# Patient Record
Sex: MALE | Race: OTHER | Employment: FULL TIME | URBAN - METROPOLITAN AREA
[De-identification: names, ages, dates, MRNs, and addresses within clinical notes are randomized per-mention and may not be internally consistent; named-entity substitution may affect disease eponyms.]

---

## 2024-06-01 ENCOUNTER — APPOINTMENT (EMERGENCY)
Dept: CT IMAGING | Facility: HOSPITAL | Age: 44
DRG: 728 | End: 2024-06-01
Payer: COMMERCIAL

## 2024-06-01 ENCOUNTER — HOSPITAL ENCOUNTER (INPATIENT)
Facility: HOSPITAL | Age: 44
LOS: 1 days | Discharge: HOME | DRG: 728 | End: 2024-06-03
Attending: EMERGENCY MEDICINE | Admitting: STUDENT IN AN ORGANIZED HEALTH CARE EDUCATION/TRAINING PROGRAM
Payer: COMMERCIAL

## 2024-06-01 ENCOUNTER — APPOINTMENT (EMERGENCY)
Dept: ULTRASOUND IMAGING | Facility: HOSPITAL | Age: 44
DRG: 728 | End: 2024-06-01
Payer: COMMERCIAL

## 2024-06-01 DIAGNOSIS — N49.1 FUNICULITIS: ICD-10-CM

## 2024-06-01 DIAGNOSIS — N17.9 ACUTE KIDNEY INJURY (HCC): Primary | ICD-10-CM

## 2024-06-01 DIAGNOSIS — R10.9 ABDOMINAL PAIN: ICD-10-CM

## 2024-06-01 LAB
ALBUMIN SERPL BCP-MCNC: 4.2 G/DL (ref 3.5–5)
ALP SERPL-CCNC: 51 U/L (ref 34–104)
ALT SERPL W P-5'-P-CCNC: 19 U/L (ref 7–52)
ANION GAP SERPL CALCULATED.3IONS-SCNC: 9 MMOL/L (ref 4–13)
AST SERPL W P-5'-P-CCNC: 17 U/L (ref 13–39)
BACTERIA UR QL AUTO: ABNORMAL /HPF
BASOPHILS # BLD AUTO: 0.03 THOUSANDS/ÂΜL (ref 0–0.1)
BASOPHILS NFR BLD AUTO: 0 % (ref 0–1)
BILIRUB SERPL-MCNC: 0.39 MG/DL (ref 0.2–1)
BILIRUB UR QL STRIP: NEGATIVE
BUN SERPL-MCNC: 29 MG/DL (ref 5–25)
CALCIUM SERPL-MCNC: 9.1 MG/DL (ref 8.4–10.2)
CHLORIDE SERPL-SCNC: 104 MMOL/L (ref 96–108)
CLARITY UR: CLEAR
CO2 SERPL-SCNC: 27 MMOL/L (ref 21–32)
COLOR UR: YELLOW
CREAT SERPL-MCNC: 3.04 MG/DL (ref 0.6–1.3)
EOSINOPHIL # BLD AUTO: 0.22 THOUSAND/ÂΜL (ref 0–0.61)
EOSINOPHIL NFR BLD AUTO: 3 % (ref 0–6)
ERYTHROCYTE [DISTWIDTH] IN BLOOD BY AUTOMATED COUNT: 12.6 % (ref 11.6–15.1)
GFR SERPL CREATININE-BSD FRML MDRD: 24 ML/MIN/1.73SQ M
GLUCOSE SERPL-MCNC: 96 MG/DL (ref 65–140)
GLUCOSE UR STRIP-MCNC: NEGATIVE MG/DL
HCT VFR BLD AUTO: 48 % (ref 36.5–49.3)
HGB BLD-MCNC: 15.8 G/DL (ref 12–17)
HGB UR QL STRIP.AUTO: ABNORMAL
IMM GRANULOCYTES # BLD AUTO: 0.02 THOUSAND/UL (ref 0–0.2)
IMM GRANULOCYTES NFR BLD AUTO: 0 % (ref 0–2)
KETONES UR STRIP-MCNC: NEGATIVE MG/DL
LEUKOCYTE ESTERASE UR QL STRIP: NEGATIVE
LIPASE SERPL-CCNC: 11 U/L (ref 11–82)
LYMPHOCYTES # BLD AUTO: 2.02 THOUSANDS/ÂΜL (ref 0.6–4.47)
LYMPHOCYTES NFR BLD AUTO: 24 % (ref 14–44)
MCH RBC QN AUTO: 30.1 PG (ref 26.8–34.3)
MCHC RBC AUTO-ENTMCNC: 32.9 G/DL (ref 31.4–37.4)
MCV RBC AUTO: 91 FL (ref 82–98)
MONOCYTES # BLD AUTO: 0.73 THOUSAND/ÂΜL (ref 0.17–1.22)
MONOCYTES NFR BLD AUTO: 9 % (ref 4–12)
MUCOUS THREADS UR QL AUTO: ABNORMAL
NEUTROPHILS # BLD AUTO: 5.32 THOUSANDS/ÂΜL (ref 1.85–7.62)
NEUTS SEG NFR BLD AUTO: 64 % (ref 43–75)
NITRITE UR QL STRIP: NEGATIVE
NON-SQ EPI CELLS URNS QL MICRO: ABNORMAL /HPF
NRBC BLD AUTO-RTO: 0 /100 WBCS
PH UR STRIP.AUTO: 7.5 [PH] (ref 4.5–8)
PLATELET # BLD AUTO: 186 THOUSANDS/UL (ref 149–390)
PMV BLD AUTO: 9.3 FL (ref 8.9–12.7)
POTASSIUM SERPL-SCNC: 4.3 MMOL/L (ref 3.5–5.3)
PROT SERPL-MCNC: 7 G/DL (ref 6.4–8.4)
PROT UR STRIP-MCNC: NEGATIVE MG/DL
RBC # BLD AUTO: 5.25 MILLION/UL (ref 3.88–5.62)
RBC #/AREA URNS AUTO: ABNORMAL /HPF
SODIUM SERPL-SCNC: 140 MMOL/L (ref 135–147)
SP GR UR STRIP.AUTO: 1.02 (ref 1–1.03)
UROBILINOGEN UR QL STRIP.AUTO: 0.2 E.U./DL
WBC # BLD AUTO: 8.34 THOUSAND/UL (ref 4.31–10.16)
WBC #/AREA URNS AUTO: ABNORMAL /HPF

## 2024-06-01 PROCEDURE — 83690 ASSAY OF LIPASE: CPT | Performed by: EMERGENCY MEDICINE

## 2024-06-01 PROCEDURE — 96361 HYDRATE IV INFUSION ADD-ON: CPT

## 2024-06-01 PROCEDURE — 87491 CHLMYD TRACH DNA AMP PROBE: CPT | Performed by: EMERGENCY MEDICINE

## 2024-06-01 PROCEDURE — 76870 US EXAM SCROTUM: CPT

## 2024-06-01 PROCEDURE — 87591 N.GONORRHOEAE DNA AMP PROB: CPT | Performed by: EMERGENCY MEDICINE

## 2024-06-01 PROCEDURE — 74176 CT ABD & PELVIS W/O CONTRAST: CPT

## 2024-06-01 PROCEDURE — 36415 COLL VENOUS BLD VENIPUNCTURE: CPT | Performed by: EMERGENCY MEDICINE

## 2024-06-01 PROCEDURE — 96374 THER/PROPH/DIAG INJ IV PUSH: CPT

## 2024-06-01 PROCEDURE — 99285 EMERGENCY DEPT VISIT HI MDM: CPT | Performed by: EMERGENCY MEDICINE

## 2024-06-01 PROCEDURE — 80053 COMPREHEN METABOLIC PANEL: CPT | Performed by: EMERGENCY MEDICINE

## 2024-06-01 PROCEDURE — 81001 URINALYSIS AUTO W/SCOPE: CPT

## 2024-06-01 PROCEDURE — 99221 1ST HOSP IP/OBS SF/LOW 40: CPT | Performed by: INTERNAL MEDICINE

## 2024-06-01 PROCEDURE — 96375 TX/PRO/DX INJ NEW DRUG ADDON: CPT

## 2024-06-01 PROCEDURE — 99284 EMERGENCY DEPT VISIT MOD MDM: CPT

## 2024-06-01 PROCEDURE — 85025 COMPLETE CBC W/AUTO DIFF WBC: CPT | Performed by: EMERGENCY MEDICINE

## 2024-06-01 RX ORDER — HEPARIN SODIUM 5000 [USP'U]/ML
5000 INJECTION, SOLUTION INTRAVENOUS; SUBCUTANEOUS EVERY 8 HOURS SCHEDULED
Status: DISCONTINUED | OUTPATIENT
Start: 2024-06-01 | End: 2024-06-03 | Stop reason: HOSPADM

## 2024-06-01 RX ORDER — ONDANSETRON 2 MG/ML
4 INJECTION INTRAMUSCULAR; INTRAVENOUS EVERY 6 HOURS PRN
Status: DISCONTINUED | OUTPATIENT
Start: 2024-06-01 | End: 2024-06-03 | Stop reason: HOSPADM

## 2024-06-01 RX ORDER — LORATADINE 10 MG/1
10 TABLET ORAL DAILY
Status: DISCONTINUED | OUTPATIENT
Start: 2024-06-02 | End: 2024-06-03 | Stop reason: HOSPADM

## 2024-06-01 RX ORDER — ATORVASTATIN CALCIUM 10 MG/1
10 TABLET, FILM COATED ORAL DAILY
COMMUNITY
Start: 2023-12-19 | End: 2024-12-18

## 2024-06-01 RX ORDER — ATORVASTATIN CALCIUM 10 MG/1
10 TABLET, FILM COATED ORAL
Status: DISCONTINUED | OUTPATIENT
Start: 2024-06-02 | End: 2024-06-03 | Stop reason: HOSPADM

## 2024-06-01 RX ORDER — ONDANSETRON 2 MG/ML
4 INJECTION INTRAMUSCULAR; INTRAVENOUS ONCE
Status: COMPLETED | OUTPATIENT
Start: 2024-06-01 | End: 2024-06-01

## 2024-06-01 RX ORDER — SODIUM CHLORIDE 9 MG/ML
125 INJECTION, SOLUTION INTRAVENOUS CONTINUOUS
Status: DISCONTINUED | OUTPATIENT
Start: 2024-06-01 | End: 2024-06-03 | Stop reason: HOSPADM

## 2024-06-01 RX ORDER — ACETAMINOPHEN 325 MG/1
650 TABLET ORAL EVERY 6 HOURS PRN
Status: DISCONTINUED | OUTPATIENT
Start: 2024-06-01 | End: 2024-06-03 | Stop reason: HOSPADM

## 2024-06-01 RX ORDER — KETOROLAC TROMETHAMINE 30 MG/ML
15 INJECTION, SOLUTION INTRAMUSCULAR; INTRAVENOUS ONCE
Status: COMPLETED | OUTPATIENT
Start: 2024-06-01 | End: 2024-06-01

## 2024-06-01 RX ORDER — DOXYCYCLINE HYCLATE 100 MG/1
100 CAPSULE ORAL ONCE
Status: COMPLETED | OUTPATIENT
Start: 2024-06-01 | End: 2024-06-01

## 2024-06-01 RX ORDER — LORATADINE 10 MG/1
10 TABLET ORAL DAILY
COMMUNITY

## 2024-06-01 RX ORDER — DOXYCYCLINE HYCLATE 100 MG/1
100 CAPSULE ORAL EVERY 12 HOURS SCHEDULED
Status: DISCONTINUED | OUTPATIENT
Start: 2024-06-02 | End: 2024-06-03 | Stop reason: HOSPADM

## 2024-06-01 RX ORDER — ENOXAPARIN SODIUM 100 MG/ML
40 INJECTION SUBCUTANEOUS DAILY
Status: DISCONTINUED | OUTPATIENT
Start: 2024-06-02 | End: 2024-06-01

## 2024-06-01 RX ADMIN — DOXYCYCLINE HYCLATE 100 MG: 100 CAPSULE ORAL at 21:17

## 2024-06-01 RX ADMIN — HEPARIN SODIUM 5000 UNITS: 5000 INJECTION INTRAVENOUS; SUBCUTANEOUS at 23:22

## 2024-06-01 RX ADMIN — LIDOCAINE HYDROCHLORIDE 500 MG: 10 INJECTION, SOLUTION EPIDURAL; INFILTRATION; INTRACAUDAL; PERINEURAL at 21:16

## 2024-06-01 RX ADMIN — ONDANSETRON 4 MG: 2 INJECTION INTRAMUSCULAR; INTRAVENOUS at 18:46

## 2024-06-01 RX ADMIN — SODIUM CHLORIDE 1000 ML: 0.9 INJECTION, SOLUTION INTRAVENOUS at 18:47

## 2024-06-01 RX ADMIN — KETOROLAC TROMETHAMINE 15 MG: 30 INJECTION, SOLUTION INTRAMUSCULAR; INTRAVENOUS at 18:49

## 2024-06-01 RX ADMIN — SODIUM CHLORIDE 125 ML/HR: 0.9 INJECTION, SOLUTION INTRAVENOUS at 23:23

## 2024-06-01 RX ADMIN — SODIUM CHLORIDE 1000 ML: 0.9 INJECTION, SOLUTION INTRAVENOUS at 20:18

## 2024-06-01 NOTE — LETTER
[place] [address]  [address] [facility] [address]  Dept: [phone]    Agnieszka 3, 2024     Patient: Antony Harvey   YOB: 1980   Date of Visit: 6/1/2024       To Whom it May Concern:    Antony Harvey is under my professional care. He was seen in the hospital from 6/1/2024 to 06/03/24. He may return to work on 06/05/24 with the following limitations: 20 lbs weight restriction for 1 week.    If you have any questions or concerns, please don't hesitate to call.         Sincerely,          Lizzy Denis MD

## 2024-06-01 NOTE — ED PROVIDER NOTES
History  Chief Complaint   Patient presents with    Abdominal Pain     Pt c/o left lower abdominal pain radiating into his testicle, also c/o fever and vomiting x 3 since Tuesday. Also states that he fell at work earlier and since then the pain in his testicles has been worse.      43 y.o. M p/w LLQ pain x 4 days.  Initially burning and crampy, now sharp.  Constant.  Nonradiating.  Last vomiting/diarrhea x 3 days ago, but still feels nauseous.  Reports subjective fever, cough. Also reports having some left testicular pain that worsened today after a fall.  Denies testicular swelling, dysuria, frequency..      History provided by:  Patient   used: Yes    Abdominal Pain  Associated symptoms: cough, diarrhea (Tuesday), fever, nausea and vomiting    Associated symptoms: no dysuria        None       History reviewed. No pertinent past medical history.    History reviewed. No pertinent surgical history.    History reviewed. No pertinent family history.  I have reviewed and agree with the history as documented.    E-Cigarette/Vaping     E-Cigarette/Vaping Substances     Social History     Tobacco Use    Smoking status: Never    Smokeless tobacco: Never   Substance Use Topics    Alcohol use: Yes     Comment: occasional    Drug use: Never       Review of Systems   Constitutional:  Positive for appetite change and fever.   HENT:  Positive for sneezing. Negative for rhinorrhea.    Respiratory:  Positive for cough.    Gastrointestinal:  Positive for abdominal pain, diarrhea (Tuesday), nausea and vomiting.   Genitourinary:  Positive for testicular pain. Negative for dysuria, frequency and scrotal swelling.   Musculoskeletal:  Negative for myalgias.   Neurological:  Positive for headaches.       Physical Exam  Physical Exam  Vitals and nursing note reviewed.   Constitutional:       General: He is not in acute distress.     Appearance: Normal appearance. He is well-developed. He is not ill-appearing,  toxic-appearing or diaphoretic.   HENT:      Head: Normocephalic and atraumatic.   Eyes:      General: No scleral icterus.  Neck:      Vascular: No JVD.   Cardiovascular:      Rate and Rhythm: Normal rate and regular rhythm.      Heart sounds: Normal heart sounds. No murmur heard.  Pulmonary:      Effort: Pulmonary effort is normal. No accessory muscle usage or respiratory distress.      Breath sounds: Normal breath sounds. No stridor. No wheezing, rhonchi or rales.   Abdominal:      General: There is no distension.      Palpations: Abdomen is soft. Abdomen is not rigid. There is no mass.      Tenderness: There is abdominal tenderness in the left lower quadrant. There is no guarding or rebound. Negative signs include Kaufman's sign and McBurney's sign.   Skin:     General: Skin is warm and dry.      Coloration: Skin is not jaundiced or pale.      Findings: No rash.   Neurological:      Mental Status: He is alert.      GCS: GCS eye subscore is 4. GCS verbal subscore is 5. GCS motor subscore is 6.         Vital Signs  ED Triage Vitals   Temperature Pulse Respirations Blood Pressure SpO2   06/01/24 1817 06/01/24 1817 06/01/24 1817 06/01/24 1817 06/01/24 1817   98.7 °F (37.1 °C) 70 18 (!) 173/98 96 %      Temp Source Heart Rate Source Patient Position - Orthostatic VS BP Location FiO2 (%)   06/01/24 1817 06/01/24 1817 06/01/24 2100 06/01/24 2100 --   Oral Monitor Lying Right arm       Pain Score       06/01/24 1817       8           Vitals:    06/01/24 1817 06/01/24 2100   BP: (!) 173/98 148/90   Pulse: 70 55   Patient Position - Orthostatic VS:  Lying         Visual Acuity      ED Medications  Medications   sodium chloride 0.9 % bolus 1,000 mL (1,000 mL Intravenous New Bag 6/1/24 2018)   cefTRIAXone (ROCEPHIN) 500 mg in lidocaine (PF) (XYLOCAINE-MPF) 1 % IM only syringe (has no administration in time range)   doxycycline hyclate (VIBRAMYCIN) capsule 100 mg (has no administration in time range)   sodium chloride 0.9 %  bolus 1,000 mL (1,000 mL Intravenous New Bag 6/1/24 1847)   ketorolac (TORADOL) injection 15 mg (15 mg Intravenous Given 6/1/24 1849)   ondansetron (ZOFRAN) injection 4 mg (4 mg Intravenous Given 6/1/24 1846)       Diagnostic Studies  Results Reviewed       Procedure Component Value Units Date/Time    Urine Microscopic [[id number]]  (Abnormal) Collected: 06/01/24 2011    Lab Status: Final result Specimen: Urine, Clean Catch Updated: 06/01/24 2030     RBC, UA 4-10 /hpf      WBC, UA 1-2 /hpf      Epithelial Cells None Seen /hpf      Bacteria, UA None Seen /hpf      MUCUS THREADS Occasional    Chlamydia/GC amplified DNA by PCR [[id number]] Collected: 06/01/24 2009    Lab Status: In process Specimen: Urine, Other Updated: 06/01/24 2017    Urine Macroscopic, POC [[id number]]  (Abnormal) Collected: 06/01/24 2011    Lab Status: Final result Specimen: Urine Updated: 06/01/24 2012     Color, UA Yellow     Clarity, UA Clear     pH, UA 7.5     Leukocytes, UA Negative     Nitrite, UA Negative     Protein, UA Negative mg/dl      Glucose, UA Negative mg/dl      Ketones, UA Negative mg/dl      Urobilinogen, UA 0.2 E.U./dl      Bilirubin, UA Negative     Occult Blood, UA Trace     Specific Gravity, UA 1.020    Narrative:      CLINITEK RESULT    Comprehensive metabolic panel [[id number]]  (Abnormal) Collected: 06/01/24 1842    Lab Status: Final result Specimen: Blood from Line, Venous Updated: 06/01/24 1902     Sodium 140 mmol/L      Potassium 4.3 mmol/L      Chloride 104 mmol/L      CO2 27 mmol/L      ANION GAP 9 mmol/L      BUN 29 mg/dL      Creatinine 3.04 mg/dL      Glucose 96 mg/dL      Calcium 9.1 mg/dL      AST 17 U/L      ALT 19 U/L      Alkaline Phosphatase 51 U/L      Total Protein 7.0 g/dL      Albumin 4.2 g/dL      Total Bilirubin 0.39 mg/dL      eGFR 24 ml/min/1.73sq m     Narrative:      National Kidney Disease Foundation guidelines for Chronic Kidney Disease (CKD):     Stage 1 with normal or high GFR (GFR > 90 mL/min/1.73  square meters)    Stage 2 Mild CKD (GFR = 60-89 mL/min/1.73 square meters)    Stage 3A Moderate CKD (GFR = 45-59 mL/min/1.73 square meters)    Stage 3B Moderate CKD (GFR = 30-44 mL/min/1.73 square meters)    Stage 4 Severe CKD (GFR = 15-29 mL/min/1.73 square meters)    Stage 5 End Stage CKD (GFR <15 mL/min/1.73 square meters)  Note: GFR calculation is accurate only with a steady state creatinine    Lipase [[id number]]  (Normal) Collected: 06/01/24 1842    Lab Status: Final result Specimen: Blood from Line, Venous Updated: 06/01/24 1902     Lipase 11 u/L     CBC and differential [[id number]] Collected: 06/01/24 1842    Lab Status: Final result Specimen: Blood from Line, Venous Updated: 06/01/24 1848     WBC 8.34 Thousand/uL      RBC 5.25 Million/uL      Hemoglobin 15.8 g/dL      Hematocrit 48.0 %      MCV 91 fL      MCH 30.1 pg      MCHC 32.9 g/dL      RDW 12.6 %      MPV 9.3 fL      Platelets 186 Thousands/uL      nRBC 0 /100 WBCs      Segmented % 64 %      Immature Grans % 0 %      Lymphocytes % 24 %      Monocytes % 9 %      Eosinophils Relative 3 %      Basophils Relative 0 %      Absolute Neutrophils 5.32 Thousands/µL      Absolute Immature Grans 0.02 Thousand/uL      Absolute Lymphocytes 2.02 Thousands/µL      Absolute Monocytes 0.73 Thousand/µL      Eosinophils Absolute 0.22 Thousand/µL      Basophils Absolute 0.03 Thousands/µL                    CT abdomen pelvis wo contrast   Final Result by Kelvin Jara MD (06/01 2018)      No acute findings in the abdomen or pelvis within the limits of unenhanced technique.      Workstation performed: [id number]         US scrotum and testicles   Final Result by Kelvin Jara MD (06/01 2013)      No evidence for testicular torsion.      Question asymmetric hyperemia in the region of the left spermatic cord possibly reflective of infectious versus inflammatory funiculitis. Recommend clinical correlation.      Workstation performed: [id number]                     Procedures  Procedures         ED Course  ED Course as of 06/01/24 2110   Sat Jun 01, 2024   1848 CBC and differential  Normal   1903 Creatinine(!): 3.04  Elevated; 0.99 on 11/30/23 2003 Updated pt and family on labs and plan for admission due to XOCHITL. Pt reports pain has improved. A/w CT and US reads.   2038 Updated pt and family on CT/US result and plan for admission. Foley Text sent to [facility].                               SBIRT 22yo+      Flowsheet Row Most Recent Value   Initial Alcohol Screen: US AUDIT-C     1. How often do you have a drink containing alcohol? 0 Filed at: 06/01/2024 1958   2. How many drinks containing alcohol do you have on a typical day you are drinking?  0 Filed at: 06/01/2024 1958   3a. Male UNDER 65: How often do you have five or more drinks on one occasion? 0 Filed at: 06/01/2024 1958   Audit-C Score 0 Filed at: 06/01/2024 1958   JUSTYNA: How many times in the past year have you...    Used an illegal drug or used a prescription medication for non-medical reasons? Never Filed at: 06/01/2024 1958                      Medical Decision Making  1. LLQ pain - Will check CBC as marker of infection, CMP to r/o hepatitis/biliary disease, lipase to r/o pancreatitis, urine to r/o UTI, CT A/P to r/o ureteral stone/colitis/diverticulitis.  2. Testicular pain - Will US to r/o torsion, send urine to r/o UTI/GC/chlamydia.    Amount and/or Complexity of Data Reviewed  Labs: ordered. Decision-making details documented in ED Course.  Radiology: ordered.    Risk  Prescription drug management.  Decision regarding hospitalization.             Disposition  Final diagnoses:   Acute kidney injury (HCC)   Abdominal pain   Funiculitis     Time reflects when diagnosis was documented in both MDM as applicable and the Disposition within this note       Time User Action Codes Description Comment    6/1/2024  8:10 PM Loly Molina [N17.9] Acute kidney injury (HCC)     6/1/2024  8:11 PM Loly Molina Add  [R10.9] Abdominal pain     6/1/2024  8:47 PM Loly Molina Add [N49.1] Funiculitis           ED Disposition       ED Disposition   Admit    Condition   Stable    Date/Time   Sat Jun 1, 2024 2054    Comment   Case was discussed with Dr. Andrew and the patient's admission status was agreed to be Admission Status: observation status to the service of Dr. Andrew .               Follow-up Information    None         Patient's Medications    No medications on file       No discharge procedures on file.    PDMP Review       None            ED Provider  Electronically Signed by             Loly Molina DO  06/01/24 6345

## 2024-06-02 PROBLEM — N17.9 AKI (ACUTE KIDNEY INJURY) (HCC): Status: ACTIVE | Noted: 2024-06-02

## 2024-06-02 LAB
ANION GAP SERPL CALCULATED.3IONS-SCNC: 3 MMOL/L (ref 4–13)
BUN SERPL-MCNC: 21 MG/DL (ref 5–25)
CALCIUM SERPL-MCNC: 8.8 MG/DL (ref 8.4–10.2)
CHLORIDE SERPL-SCNC: 109 MMOL/L (ref 96–108)
CO2 SERPL-SCNC: 28 MMOL/L (ref 21–32)
CREAT SERPL-MCNC: 2.15 MG/DL (ref 0.6–1.3)
GFR SERPL CREATININE-BSD FRML MDRD: 36 ML/MIN/1.73SQ M
GLUCOSE SERPL-MCNC: 110 MG/DL (ref 65–140)
POTASSIUM SERPL-SCNC: 4.7 MMOL/L (ref 3.5–5.3)
SODIUM SERPL-SCNC: 140 MMOL/L (ref 135–147)

## 2024-06-02 PROCEDURE — 80048 BASIC METABOLIC PNL TOTAL CA: CPT | Performed by: STUDENT IN AN ORGANIZED HEALTH CARE EDUCATION/TRAINING PROGRAM

## 2024-06-02 PROCEDURE — 99232 SBSQ HOSP IP/OBS MODERATE 35: CPT | Performed by: STUDENT IN AN ORGANIZED HEALTH CARE EDUCATION/TRAINING PROGRAM

## 2024-06-02 RX ADMIN — SODIUM CHLORIDE 125 ML/HR: 0.9 INJECTION, SOLUTION INTRAVENOUS at 13:23

## 2024-06-02 RX ADMIN — ATORVASTATIN CALCIUM 10 MG: 10 TABLET, FILM COATED ORAL at 16:04

## 2024-06-02 RX ADMIN — HEPARIN SODIUM 5000 UNITS: 5000 INJECTION INTRAVENOUS; SUBCUTANEOUS at 20:32

## 2024-06-02 RX ADMIN — DOXYCYCLINE 100 MG: 100 CAPSULE ORAL at 08:28

## 2024-06-02 RX ADMIN — HEPARIN SODIUM 5000 UNITS: 5000 INJECTION INTRAVENOUS; SUBCUTANEOUS at 05:21

## 2024-06-02 RX ADMIN — HEPARIN SODIUM 5000 UNITS: 5000 INJECTION INTRAVENOUS; SUBCUTANEOUS at 13:23

## 2024-06-02 RX ADMIN — SODIUM CHLORIDE 125 ML/HR: 0.9 INJECTION, SOLUTION INTRAVENOUS at 05:21

## 2024-06-02 RX ADMIN — LORATADINE 10 MG: 10 TABLET ORAL at 08:28

## 2024-06-02 RX ADMIN — DOXYCYCLINE 100 MG: 100 CAPSULE ORAL at 20:32

## 2024-06-02 RX ADMIN — SODIUM CHLORIDE 125 ML/HR: 0.9 INJECTION, SOLUTION INTRAVENOUS at 21:28

## 2024-06-02 NOTE — UTILIZATION REVIEW
Initial Clinical Review  Observation on 06/01 @ 2108 upgraded to Inpatient on 06/02. Pt requiring continued stay d/t continued management of XOCHITL and funiculitis.    Admission: Date/Time/Statement:   Admission Orders (From admission, onward)       Ordered        06/02/24 1730  INPATIENT ADMISSION  Once            06/01/24 2108  Place in Observation  Once                          Orders Placed This Encounter   Procedures    INPATIENT ADMISSION     Standing Status:   Standing     Number of Occurrences:   1     Order Specific Question:   Level of Care     Answer:   Med Surg [16]     Order Specific Question:   Estimated length of stay     Answer:   More than 2 Midnights     Order Specific Question:   Certification     Answer:   I certify that inpatient services are medically necessary for this patient for a duration of greater than two midnights. See H&P and MD Progress Notes for additional information about the patient's course of treatment.     Comments:   antibiotics, xochitl, iv hydration     ED Arrival Information       Expected   -    Arrival   6/1/2024 18:08    Acuity   Urgent              Means of arrival   Walk-In    Escorted by   Family Member    Service   Hospitalist    Admission type   Emergency              Arrival complaint   abdominal pain             Chief Complaint   Patient presents with    Abdominal Pain     Pt c/o left lower abdominal pain radiating into his testicle, also c/o fever and vomiting x 3 since Tuesday. Also states that he fell at work earlier and since then the pain in his testicles has been worse.        Initial Presentation: 43 y.o. male presented to the ED from work w/ c/o L lower abdominal pain that started on Tuesday. Described as sharp, non radiating at first. The next day, associated w/ nausea w/ multiple episodes of vomiting and chills. Today while he was at work, he felt excruciating pain around his left scrotum rubbing on his inner thigh after a misstep.   In the ED, /98.  "Creatinine 3.04.   Ct abd/pelvis is negative for acute pathology.   Scrotal/testicular US shows \"Question asymmetric hyperemia in the region of the left spermatic cord. No evidence for extratesticular mass or hernia demonstrated\"   On exam, skin rash, LLQ abdominal tenderness present.  Given 2L total IVF bolus, IV Toradol, IV Zofran, IM Rocephin, po Vibramycin.    Admit as observation level of care for XOCHITL, funiculitis.  Plan: Continue with IVF @125ml/hr. Repeat renal function in AM. Continue with doxy 100mg BID for now. Follow up GC/chlamydia screen. Zofran prn N/V. Urology consult in AM     Anticipated Length of Stay/Certification Statement: The patient will continue to require additional inpatient hospital stay due to xochitl, iv hydration     06/02 Obs to IP  Urology Consult: Left-sided testicular pain   Plan: Continue medical optimization per primary team. No obstructive/postrenal causes for XOCHITL.  Therefore no need for surgical intervention. ecommend conservative management with supportive undergarments, sitz bath's, anti-inflammatories. May continue for total 10-day course of Doxycycline. For empiric management. Pending GC.     IM Notes: Pt reports improvement in his symptoms. Creatinine improving- 2.15 today. Cont IV hydration. Urinary retention protocol. Mon creatinine. Awaiting chlamydia / GC     Date: 06/03  Day 2: Pt scrotum still swelling. GC/chlamydia neg. Dc IVF. Cont Vibramycin. Cont Claritin. Cont Sitz bath.    ED Triage Vitals   Temperature Pulse Respirations Blood Pressure SpO2   06/01/24 1817 06/01/24 1817 06/01/24 1817 06/01/24 1817 06/01/24 1817   98.7 °F (37.1 °C) 70 18 (!) 173/98 96 %      Temp Source Heart Rate Source Patient Position - Orthostatic VS BP Location FiO2 (%)   06/01/24 1817 06/01/24 1817 06/01/24 2100 06/01/24 2100 --   Oral Monitor Lying Right arm       Pain Score       06/01/24 1817       8          Wt Readings from Last 1 Encounters:   06/01/24 90 kg (198 lb 6.6 oz) "     Additional Vital Signs:   Date/Time Temp Pulse Resp BP MAP (mmHg) SpO2 O2 Device Patient Position - Orthostatic VS   06/03/24 07:17:39 97.9 °F (36.6 °C) 60 16 123/83 96 96 % None (Room air) Lying   06/02/24 2310 98.1 °F (36.7 °C) 51 Abnormal  16 143/91 104 97 % None (Room air) Lying   06/02/24 15:17:53 98.1 °F (36.7 °C) 57 16 122/81 95 97 % None (Room air) Lying     Date/Time Temp Pulse Resp BP MAP (mmHg) SpO2 O2 Device Patient Position - Orthostatic VS   06/02/24 07:25:58 98.1 °F (36.7 °C) 65 16 121/85 97 97 % None (Room air) Lying   06/02/24 0029 -- 56 16 142/87 100 97 % None (Room air) Sitting   06/01/24 2100 -- 55 16 148/90 114 97 % None (Room air) Lying       Pertinent Labs/Diagnostic Test Results:   CT abdomen pelvis wo contrast   Final Result by Kelvin Jara MD (06/01 2018)      No acute findings in the abdomen or pelvis within the limits of unenhanced technique.      Workstation performed: [id number]         US scrotum and testicles   Final Result by Kelvin Jara MD (06/01 2013)      No evidence for testicular torsion.      Question asymmetric hyperemia in the region of the left spermatic cord possibly reflective of infectious versus inflammatory funiculitis. Recommend clinical correlation.      Workstation performed: [id number]               Results from last 7 days   Lab Units 06/01/24  1842   WBC Thousand/uL 8.34   HEMOGLOBIN g/dL 15.8   HEMATOCRIT % 48.0   PLATELETS Thousands/uL 186   TOTAL NEUT ABS Thousands/µL 5.32         Results from last 7 days   Lab Units 06/03/24  0518 06/02/24  1017 06/01/24  1842   SODIUM mmol/L 140 140 140   POTASSIUM mmol/L 4.0 4.7 4.3   CHLORIDE mmol/L 111* 109* 104   CO2 mmol/L 24 28 27   ANION GAP mmol/L 5 3* 9   BUN mg/dL 18 21 29*   CREATININE mg/dL 1.51* 2.15* 3.04*   EGFR ml/min/1.73sq m 56 36 24   CALCIUM mg/dL 8.1* 8.8 9.1     Results from last 7 days   Lab Units 06/01/24  1842   AST U/L 17   ALT U/L 19   ALK PHOS U/L 51   TOTAL PROTEIN g/dL 7.0   ALBUMIN g/dL 4.2    TOTAL BILIRUBIN mg/dL 0.39         Results from last 7 days   Lab Units 06/03/24  0518 06/02/24  1017 06/01/24  1842   GLUCOSE RANDOM mg/dL 100 110 96                             Results from last 7 days   Lab Units 06/01/24  1842   LIPASE u/L 11                 Results from last 7 days   Lab Units 06/01/24 2011   CLARITY UA  Clear   COLOR UA  Yellow   SPEC GRAV UA  1.020   PH UA  7.5   GLUCOSE UA mg/dl Negative   KETONES UA mg/dl Negative   BLOOD UA  Trace*   PROTEIN UA mg/dl Negative   NITRITE UA  Negative   BILIRUBIN UA  Negative   UROBILINOGEN UA E.U./dl 0.2   LEUKOCYTES UA  Negative   WBC UA /hpf 1-2   RBC UA /hpf 4-10*   BACTERIA UA /hpf None Seen   EPITHELIAL CELLS WET PREP /hpf None Seen   MUCUS THREADS  Occasional*           ED Treatment:   Medication Administration from 06/01/2024 1808 to 06/01/2024 2203         Date/Time Order Dose Route Action Action by Comments     06/01/2024 2118 EDT sodium chloride 0.9 % bolus 1,000 mL 0 mL Intravenous Stopped Sepideh Braga RN --     06/01/2024 1847 EDT sodium chloride 0.9 % bolus 1,000 mL 1,000 mL Intravenous New Bag Sepideh Braga RN --     06/01/2024 1849 EDT ketorolac (TORADOL) injection 15 mg 15 mg Intravenous Given Sepideh Braga RN --     06/01/2024 1846 EDT ondansetron (ZOFRAN) injection 4 mg 4 mg Intravenous Given Sepideh Braga RN --     06/01/2024 2118 EDT sodium chloride 0.9 % bolus 1,000 mL 0 mL Intravenous Stopped Sepideh Braga RN --     06/01/2024 2018 EDT sodium chloride 0.9 % bolus 1,000 mL 1,000 mL Intravenous New Bag Sepideh Braga RN --     06/01/2024 2116 EDT cefTRIAXone (ROCEPHIN) 500 mg in lidocaine (PF) (XYLOCAINE-MPF) 1 % IM only syringe 500 mg Intramuscular Given Sepideh Braga RN --     06/01/2024 2117 EDT doxycycline hyclate (VIBRAMYCIN) capsule 100 mg 100 mg Oral Given Sepideh Braga RN --          History reviewed. No pertinent past medical history.  Present on Admission:   Funiculitis   XOCHITL (acute kidney injury) (HCC)      Admitting  Diagnosis: Funiculitis [N49.1]  Abdominal pain [R10.9]  Acute kidney injury (HCC) [N17.9]  Age/Sex: 43 y.o. male  Admission Orders:  SCD    Scheduled Medications:  atorvastatin (LIPITOR) tablet 10 mg po daily   doxycycline hyclate (VIBRAMYCIN) capsule 100 mg po q12h  heparin (porcine) subcutaneous injection 5,000 Units q8h SC  loratadine (CLARITIN) tablet 10 mg po daily    Continuous IV Infusions:  sodium chloride, 125 mL/hr, Intravenous, Continuous      PRN Meds:        IP CONSULT TO UROLOGY    Network Utilization Review Department  ATTENTION: Please call with any questions or concerns to [phone] and carefully listen to the prompts so that you are directed to the right person. All voicemails are confidential.   For Discharge needs, contact Care Management DC Support Team at [phone] opt. 2  Send all requests for admission clinical reviews, approved or denied determinations and any other requests to dedicated fax number below belonging to the [place] where the patient is receiving treatment. List of dedicated fax numbers for the Facilities:  FACILITY NAME UR FAX NUMBER   ADMISSION DENIALS (Administrative/Medical Necessity) [phone]   DISCHARGE SUPPORT TEAM (NETWORK) [phone]   PARENT CHILD HEALTH (Maternity/NICU/Pediatrics) [phone]   [facility] [phone]   [facility] [phone]   [facility] [phone]   [facility] [phone]   [facility] [phone]   [facility] [phone]   [facility] [phone]   [facility] [phone]   [facility] [phone]   [facility] [phone]   [facility] [phone]   [facility] [phone]

## 2024-06-02 NOTE — ASSESSMENT & PLAN NOTE
Suspect 2/2 poor oral intake, N/V  CT abd/pelvis shows no renal/bladder pathology   UA not suggestive of infection  Continue with IVF @125ml/hr  Repeat renal function in AM

## 2024-06-02 NOTE — PROGRESS NOTES
[facility]  Progress Note  Name: Antony Harvey I  MRN: [id number]  Unit/Bed#: E5 -01 I Date of Admission: 6/1/2024   Date of Service: 6/2/2024 I Hospital Day: 0    Assessment & Plan   * XOCHITL (acute kidney injury) (HCC)  Assessment & Plan  43 year old Chinese speaking male was admitted due to acute kidney injury. Etiology: Ibuprofen? (Although one time use only last Thursday). Versus decreased po intake / Pre-renal.   Continue IV hydration  Urinary retention protocol    Recent Labs     06/01/24  1842 06/02/24  1017   BUN 29* 21   CREATININE 3.04* 2.15*   EGFR 24 36       Results from last 7 days   Lab Units 06/01/24 2011   BLOOD UA  Trace*   PROTEIN UA mg/dl Negative         Funiculitis  Assessment & Plan  Had nausea, vomiting, and abdominal pain. Improving.   CT Abdomen pelvis showing: No acute findings in the abdomen or pelvis within the limits of unenhanced technique.   US scrotum and testicles: No evidence for testicular torsion. Question asymmetric hyperemia in the region of the left spermatic cord possibly reflective of infectious versus inflammatory funiculitis. Recommend clinical correlation.  Received Ceftriaxone IM  Urology recommendations appreciated:   Conservative management with supportive undergarments, sitz bath's, anti-inflammatories.  Continue for total 10-day course of doxycycline.  Awaiting chlamydia / GC           VTE Pharmacologic Prophylaxis:   Pharmacologic: Heparin  Mechanical VTE Prophylaxis in Place: Yes    Discussions with Specialists or Other Care Team Provider: urology    Education and Discussions with Family / Patient: patient, spouse    Current Length of Stay: 0 day(s)    Current Patient Status: Observation   Certification Statement: The patient will continue to require additional inpatient hospital stay due to xochitl, iv hydration    Discharge Plan: neema    Code Status: Level 1 - Full Code      Subjective:   Patient seen and examined at  bedside. Reports improvement with his symptoms. [employer]  services were utilized. [id number]    Objective:     Vitals:   Temp (24hrs), Av.4 °F (36.9 °C), Min:98.1 °F (36.7 °C), Max:98.7 °F (37.1 °C)    Temp:  [98.1 °F (36.7 °C)-98.7 °F (37.1 °C)] 98.1 °F (36.7 °C)  HR:  [55-70] 65  Resp:  [16-18] 16  BP: (121-173)/(85-98) 121/85  SpO2:  [96 %-97 %] 97 %  There is no height or weight on file to calculate BMI.     Input and Output Summary (last 24 hours):       Intake/Output Summary (Last 24 hours) at 2024 1113  Last data filed at 2024 0854  Gross per 24 hour   Intake 120 ml   Output --   Net 120 ml       Physical Exam:     Physical Exam  Vitals reviewed.   HENT:      Head: Normocephalic.      Nose: Nose normal.      Mouth/Throat:      Mouth: Mucous membranes are moist.   Eyes:      General: No scleral icterus.  Cardiovascular:      Rate and Rhythm: Normal rate and regular rhythm.   Pulmonary:      Effort: Pulmonary effort is normal. No respiratory distress.      Breath sounds: No stridor. No wheezing or rales.   Abdominal:      General: There is no distension.      Palpations: Abdomen is soft.      Tenderness: There is no abdominal tenderness.   Skin:     General: Skin is warm.   Neurological:      Mental Status: He is alert and oriented to person, place, and time.   Psychiatric:         Mood and Affect: Mood normal.         Behavior: Behavior normal.       Additional Data:     Labs:    Results from last 7 days   Lab Units 24  1842   WBC Thousand/uL 8.34   HEMOGLOBIN g/dL 15.8   HEMATOCRIT % 48.0   PLATELETS Thousands/uL 186   SEGS PCT % 64   LYMPHO PCT % 24   MONO PCT % 9   EOS PCT % 3     Results from last 7 days   Lab Units 24  1017 24  1842   SODIUM mmol/L 140 140   POTASSIUM mmol/L 4.7 4.3   CHLORIDE mmol/L 109* 104   CO2 mmol/L 28 27   BUN mg/dL 21 29*   CREATININE mg/dL 2.15* 3.04*   ANION GAP mmol/L 3* 9   CALCIUM mg/dL 8.8 9.1   ALBUMIN g/dL  --  4.2   TOTAL BILIRUBIN mg/dL   --  0.39   ALK PHOS U/L  --  51   ALT U/L  --  19   AST U/L  --  17   GLUCOSE RANDOM mg/dL 110 96                           * I Have Reviewed All Lab Data Listed Above.  * Additional Pertinent Lab Tests Reviewed: All Labs For Current Hospital Admission Reviewed    Mobility:  Basic Mobility Inpatient Raw Score: 24  -HL Goal: 8: Walk 250 feet or more  JH-HLM Achieved: 8: Walk 250 feet ot more    Lines:   Invasive Devices       Peripheral Intravenous Line  Duration             Peripheral IV 06/01/24 Right Antecubital <1 day                       Imaging:    Imaging Reports Reviewed Today Include: US scrotum, ct a/p    Recent Cultures (last 7 days):           Last 24 Hours Medication List:   Current Facility-Administered Medications   Medication Dose Route Frequency Provider Last Rate    acetaminophen  650 mg Oral Q6H PRN Radha Andrew MD      atorvastatin  10 mg Oral Daily With Dinner Radha Andrew MD      doxycycline hyclate  100 mg Oral Q12H DEAN Andrew MD      heparin (porcine)  5,000 Units Subcutaneous Q8H DEAN Andrew MD      loratadine  10 mg Oral Daily Radha Andrew MD      ondansetron  4 mg Intravenous Q6H PRN Radha Andrew MD      sodium chloride  125 mL/hr Intravenous Continuous Radha Andrew  mL/hr (06/02/24 0521)        Today, Patient Was Seen By: Fam Hudson MD    ** Please Note: Dictation voice to text software may have been used in the creation of this document. **

## 2024-06-02 NOTE — ASSESSMENT & PLAN NOTE
Had nausea, vomiting, and abdominal pain. Improving.   CT Abdomen pelvis showing: No acute findings in the abdomen or pelvis within the limits of unenhanced technique.   US scrotum and testicles: No evidence for testicular torsion. Question asymmetric hyperemia in the region of the left spermatic cord possibly reflective of infectious versus inflammatory funiculitis. Recommend clinical correlation.  Received Ceftriaxone IM  Urology recommendations appreciated:   Conservative management with supportive undergarments, sitz bath's, anti-inflammatories.  Continue for total 10-day course of doxycycline.  Awaiting chlamydia / GC

## 2024-06-02 NOTE — ASSESSMENT & PLAN NOTE
43 year old Kyrgyz speaking male was admitted due to acute kidney injury. Etiology: Ibuprofen? (Although one time use only last Thursday). Pre-renal.   Continue IV hydration  Urinary retention protocol    Recent Labs     06/01/24  1842 06/02/24  1017   BUN 29* 21   CREATININE 3.04* 2.15*   EGFR 24 36       Results from last 7 days   Lab Units 06/01/24 2011   BLOOD UA  Trace*   PROTEIN UA mg/dl Negative

## 2024-06-02 NOTE — PLAN OF CARE
Problem: PAIN - ADULT  Goal: Verbalizes/displays adequate comfort level or baseline comfort level  Description: Interventions:  - Encourage patient to monitor pain and request assistance  - Assess pain using appropriate pain scale  - Administer analgesics based on type and severity of pain and evaluate response  - Implement non-pharmacological measures as appropriate and evaluate response  - Consider cultural and social influences on pain and pain management  - Notify physician/advanced practitioner if interventions unsuccessful or patient reports new pain  Outcome: Progressing     Problem: INFECTION - ADULT  Goal: Absence or prevention of progression during hospitalization  Description: INTERVENTIONS:  - Assess and monitor for signs and symptoms of infection  - Monitor lab/diagnostic results  - Monitor all insertion sites, i.e. indwelling lines, tubes, and drains  - Monitor endotracheal if appropriate and nasal secretions for changes in amount and color  - [place] appropriate cooling/warming therapies per order  - Administer medications as ordered  - Instruct and encourage patient and family to use good hand hygiene technique  - Identify and instruct in appropriate isolation precautions for identified infection/condition  Outcome: Progressing     Problem: DISCHARGE PLANNING  Goal: Discharge to home or other facility with appropriate resources  Description: INTERVENTIONS:  - Identify barriers to discharge w/patient and caregiver  - Arrange for needed discharge resources and transportation as appropriate  - Identify discharge learning needs (meds, wound care, etc.)  - Arrange for interpretive services to assist at discharge as needed  - Refer to Case Management Department for coordinating discharge planning if the patient needs post-hospital services based on physician/advanced practitioner order or complex needs related to functional status, cognitive ability, or social support system  Outcome: Progressing      Problem: Knowledge Deficit  Goal: Patient/family/caregiver demonstrates understanding of disease process, treatment plan, medications, and discharge instructions  Description: Complete learning assessment and assess knowledge base.  Interventions:  - Provide teaching at level of understanding  - Provide teaching via preferred learning methods  Outcome: Progressing

## 2024-06-02 NOTE — H&P
[facility]  H&P  Name: Antony Harvey 43 y.o. male I MRN: [id number]  Unit/Bed#: E5 -01 I Date of Admission: 6/1/2024   Date of Service: 6/2/2024 I Hospital Day: 0      Assessment & Plan   * XOCHITL (acute kidney injury) (HCC)  Assessment & Plan  Suspect 2/2 poor oral intake, N/V  CT abd/pelvis shows no renal/bladder pathology   UA not suggestive of infection  Continue with IVF @125ml/hr  Repeat renal function in AM      Funiculitis  Assessment & Plan  Patient presented with abdominal pain, N/V  CT abd/pelvis negative  Scrotal U/S suspicious for funiculitis  S/p CTX 500mg IM and received doxycycline  On examination of scrotal area, no sign of cellulitis  Continue with doxy 100mg BID for now, may be eble to dc if infectious w/u negative.   Follow up GC/chlamydia screen  Zofran prn N/V  Urology consult in AM            VTE Prophylaxis: Heparin  / sequential compression device   Code Status: Level 1  POLST: There is no POLST form on file for this patient (pre-hospital)  Discussion with family: Yes, sister, girlfriend and niece    Anticipated Length of Stay:  Patient will be admitted on an Observation basis with an anticipated length of stay of  1 - 2 midnights.   Justification for Hospital Stay: as above    Total Time for Visit, including Counseling / Coordination of Care: 45 minutes.  Greater than 50% of this total time spent on direct patient counseling and coordination of care.    Chief Complaint:   abdominal pain    History of Present Illness:    Antony Harvey is a 43 y.o. male who presents with complaints of abdominal pain. Patient was in his usual state of health up until Tuesday when he began to experience left lower abdominal pain. Initially he was able to function and go about his day. It was sharp, non radiating at first. By the next day, it became associated with nausea, multiple episodes of non-bloody vomiting. He also felt chills but did not take his  "temperature. Today while he was at work, he felt excruciating pain around his left scrotum rubbing on his inner thigh after a misstep. This is what prompted him to come to the ED.   In ED, he is afebrile, hypertensive due to pain. A Ct abd/pelvis is negative for acute pathology. A scrotal/testicular ultrasound shows \"Question asymmetric hyperemia in the region of the left spermatic cord. No evidence for extratesticular mass or hernia demonstrated\"  He was also noted to have XOCHITL. He was give antibiotics, pain medication, fluids and will be admitted for further management.     Review of Systems:    Review of Systems   Constitutional:  Positive for activity change, appetite change, chills and fever.   HENT:  Negative for rhinorrhea.    Eyes:  Negative for visual disturbance.   Respiratory:  Negative for cough and shortness of breath.    Cardiovascular:  Negative for chest pain and leg swelling.   Gastrointestinal:  Positive for abdominal pain, nausea and vomiting. Negative for blood in stool, constipation and diarrhea.   Endocrine: Negative for polyuria.   Genitourinary:  Negative for decreased urine volume, difficulty urinating, dysuria, hematuria, penile discharge, penile pain and scrotal swelling.   Musculoskeletal:  Negative for back pain.   Skin:  Negative for rash.   Neurological:  Negative for dizziness, syncope and light-headedness.   Hematological:  Negative for adenopathy.       Past Medical and Surgical History:     History reviewed. No pertinent past medical history.    History reviewed. No pertinent surgical history.    Meds/Allergies:    Prior to Admission medications    Medication Sig Start Date End Date Taking? Authorizing Provider   atorvastatin (LIPITOR) 10 mg tablet Take 10 mg by mouth daily 12/19/23 12/18/24 Yes Historical Provider, MD   loratadine (CLARITIN) 10 mg tablet Take 10 mg by mouth daily    Historical Provider, MD     I have reviewed home medications with patient personally.    Allergies: " No Known Allergies    Social History:     Marital Status: Single   Substance Use History:   Social History     Substance and Sexual Activity   Alcohol Use Yes    Comment: occasional     Social History     Tobacco Use   Smoking Status Never   Smokeless Tobacco Never     Social History     Substance and Sexual Activity   Drug Use Never       Family History:    non-contributory    Physical Exam:     Vitals:   Blood Pressure: 142/87 (06/02/24 0029)  Pulse: 56 (06/02/24 0029)  Temperature: 98.7 °F (37.1 °C) (06/01/24 1817)  Temp Source: Oral (06/02/24 0029)  Respirations: 16 (06/02/24 0029)  Weight - Scale: 90 kg (198 lb 6.6 oz) (06/01/24 1817)  SpO2: 97 % (06/02/24 0029)    Physical Exam  Constitutional:       General: He is not in acute distress.     Appearance: Normal appearance. He is not ill-appearing or toxic-appearing.   HENT:      Head: Normocephalic.      Nose: No congestion or rhinorrhea.   Eyes:      General: No scleral icterus.  Cardiovascular:      Rate and Rhythm: Normal rate and regular rhythm.      Heart sounds: No murmur heard.  Pulmonary:      Effort: Pulmonary effort is normal. No respiratory distress.      Breath sounds: Normal breath sounds. No wheezing.   Abdominal:      General: Bowel sounds are normal. There is no distension.      Palpations: Abdomen is soft. There is no mass.      Tenderness: There is abdominal tenderness in the left lower quadrant. There is no guarding or rebound.   Genitourinary:     Testes:         Left: Tenderness or swelling not present.      Comments: Refer to media   Musculoskeletal:      Right lower leg: No edema.      Left lower leg: No edema.   Skin:     General: Skin is warm and dry.      Findings: Rash present.   Neurological:      Mental Status: He is alert.           Additional Data:     Lab Results: I have personally reviewed pertinent reports.      Results from last 7 days   Lab Units 06/01/24  1842   WBC Thousand/uL 8.34   HEMOGLOBIN g/dL 15.8   HEMATOCRIT % 48.0    PLATELETS Thousands/uL 186   SEGS PCT % 64   LYMPHO PCT % 24   MONO PCT % 9   EOS PCT % 3     Results from last 7 days   Lab Units 06/01/24  1842   SODIUM mmol/L 140   POTASSIUM mmol/L 4.3   CHLORIDE mmol/L 104   CO2 mmol/L 27   BUN mg/dL 29*   CREATININE mg/dL 3.04*   ANION GAP mmol/L 9   CALCIUM mg/dL 9.1   ALBUMIN g/dL 4.2   TOTAL BILIRUBIN mg/dL 0.39   ALK PHOS U/L 51   ALT U/L 19   AST U/L 17   GLUCOSE RANDOM mg/dL 96                       Imaging: I have personally reviewed pertinent reports.      CT abdomen pelvis wo contrast   Final Result by Kelvin Jara MD (06/01 2018)      No acute findings in the abdomen or pelvis within the limits of unenhanced technique.      Workstation performed: [id number]         US scrotum and testicles   Final Result by Kelvin Jara MD (06/01 2013)      No evidence for testicular torsion.      Question asymmetric hyperemia in the region of the left spermatic cord possibly reflective of infectious versus inflammatory funiculitis. Recommend clinical correlation.      Workstation performed: [id number]             EKG, Pathology, and Other Studies Reviewed on Admission:   EKG: N/A    Allscripts / Epic Records Reviewed: Yes     ** Please Note: This note has been constructed using a voice recognition system. **

## 2024-06-02 NOTE — PLAN OF CARE
Problem: PAIN - ADULT  Goal: Verbalizes/displays adequate comfort level or baseline comfort level  Description: Interventions:  - Encourage patient to monitor pain and request assistance  - Assess pain using appropriate pain scale  - Administer analgesics based on type and severity of pain and evaluate response  - Implement non-pharmacological measures as appropriate and evaluate response  - Consider cultural and social influences on pain and pain management  - Notify physician/advanced practitioner if interventions unsuccessful or patient reports new pain  Outcome: Progressing     Problem: INFECTION - ADULT  Goal: Absence or prevention of progression during hospitalization  Description: INTERVENTIONS:  - Assess and monitor for signs and symptoms of infection  - Monitor lab/diagnostic results  - Monitor all insertion sites, i.e. indwelling lines, tubes, and drains  - Monitor endotracheal if appropriate and nasal secretions for changes in amount and color  - [place] appropriate cooling/warming therapies per order  - Administer medications as ordered  - Instruct and encourage patient and family to use good hand hygiene technique  - Identify and instruct in appropriate isolation precautions for identified infection/condition  Outcome: Progressing  Goal: Absence of fever/infection during neutropenic period  Description: INTERVENTIONS:  - Monitor WBC    Outcome: Progressing     Problem: DISCHARGE PLANNING  Goal: Discharge to home or other facility with appropriate resources  Description: INTERVENTIONS:  - Identify barriers to discharge w/patient and caregiver  - Arrange for needed discharge resources and transportation as appropriate  - Identify discharge learning needs (meds, wound care, etc.)  - Arrange for interpretive services to assist at discharge as needed  - Refer to Case Management Department for coordinating discharge planning if the patient needs post-hospital services based on physician/advanced  practitioner order or complex needs related to functional status, cognitive ability, or social support system  Outcome: Progressing     Problem: Knowledge Deficit  Goal: Patient/family/caregiver demonstrates understanding of disease process, treatment plan, medications, and discharge instructions  Description: Complete learning assessment and assess knowledge base.  Interventions:  - Provide teaching at level of understanding  - Provide teaching via preferred learning methods  Outcome: Progressing

## 2024-06-02 NOTE — CONSULTS
e-Consult ([facility])   Antony Harvey 43 y.o. male MRN: [id number]  Unit/Bed#: E5 -01 Encounter: [id number]      Reason for Consult / Principal Problem: Left-sided testicular pain  Consults  06/02/24      ASSESSMENT:  Patient is a 43-year-old male is presenting for left lower quadrant pain x 4 days described as burning, crampy and sharp constant nonradiating.  Along with nausea vomiting and diarrhea x 3 days.  Subjective fevers and cough.  Patient also reports left-sided testicular pain that worsened today after a fall at work.    Upon evaluation in the ED patient is afebrile hemodynamically stable, with XOCHITL of 3.04 baseline creatinine 0.99, no leukocytosis.  Hemoglobin 15.8, urine testing negative for infection.  4-10 RBCs.  GC pending.    CT scan obtained negative for an unremarkable for abnormalities or hydronephrosis of the kidneys and ureters.  Urinary bladder unremarkable and reproductive organs unremarkable.  Limited without contrast.    Testicular ultrasound also obtained.  Revealing no evidence of testicular torsion no intratesticular masses Doppler flow in bilateral testicles epididymis within normal limits small bilateral hydroceles none present.  A questionable asymmetric hyperemia in the region of the left spermatic cord possibly reflective of infectious versus inflammatory funiculitis.    RECOMMENDATIONS:  -Continue medical optimization per primary team.  -No obstructive/postrenal causes for XOCHITL.  Therefore no need for surgical intervention.  -Ultrasound revealing questionable funiculitis traversed to the inflammation around the spermatic his tube.  -Recommend conservative management with supportive undergarments, sitz bath's, anti-inflammatories.  -Patient received a dose of Rocephin and currently receiving doxycycline.  May continue for total 10-day course.  For empiric management.  -Can follow-up outpatient with urology or PCP to evaluate for resolution.      11-20 minutes, >50% of the  total time devoted to medical consultative verbal/EMR discussion between providers. Written report will be generated in the EMR.    Nicole Box PA-C

## 2024-06-02 NOTE — ASSESSMENT & PLAN NOTE
Patient presented with abdominal pain, N/V  CT abd/pelvis negative  Scrotal U/S suspicious for funiculitis  S/p CTX 500mg IM and received doxycycline  On examination of scrotal area, no sign of cellulitis  Continue with doxy 100mg BID for now, may be eble to dc if infectious w/u negative.   Follow up GC/chlamydia screen  Zofran prn N/V  Urology consult in AM

## 2024-06-02 NOTE — ASSESSMENT & PLAN NOTE
43 year old Occitan-speaking male was admitted due to acute kidney injury. Etiology: pre-renal versus Ibuprofen (Although one time use only last Thursday).  Improved with IVF, tolerating PO intake well  Stable for discharge  Referral to PCP    Recent Labs     06/01/24  1842 06/02/24  1017   BUN 29* 21   CREATININE 3.04* 2.15*   EGFR 24 36         Results from last 7 days   Lab Units 06/01/24 2011   BLOOD UA  Trace*   PROTEIN UA mg/dl Negative

## 2024-06-03 VITALS
SYSTOLIC BLOOD PRESSURE: 123 MMHG | DIASTOLIC BLOOD PRESSURE: 83 MMHG | RESPIRATION RATE: 16 BRPM | WEIGHT: 198.41 LBS | HEART RATE: 60 BPM | TEMPERATURE: 97.9 F | OXYGEN SATURATION: 96 %

## 2024-06-03 LAB
ANION GAP SERPL CALCULATED.3IONS-SCNC: 5 MMOL/L (ref 4–13)
BUN SERPL-MCNC: 18 MG/DL (ref 5–25)
C TRACH DNA SPEC QL NAA+PROBE: NEGATIVE
CALCIUM SERPL-MCNC: 8.1 MG/DL (ref 8.4–10.2)
CHLORIDE SERPL-SCNC: 111 MMOL/L (ref 96–108)
CO2 SERPL-SCNC: 24 MMOL/L (ref 21–32)
CREAT SERPL-MCNC: 1.51 MG/DL (ref 0.6–1.3)
GFR SERPL CREATININE-BSD FRML MDRD: 56 ML/MIN/1.73SQ M
GLUCOSE SERPL-MCNC: 100 MG/DL (ref 65–140)
N GONORRHOEA DNA SPEC QL NAA+PROBE: NEGATIVE
POTASSIUM SERPL-SCNC: 4 MMOL/L (ref 3.5–5.3)
SODIUM SERPL-SCNC: 140 MMOL/L (ref 135–147)

## 2024-06-03 PROCEDURE — 80048 BASIC METABOLIC PNL TOTAL CA: CPT | Performed by: INTERNAL MEDICINE

## 2024-06-03 PROCEDURE — 99239 HOSP IP/OBS DSCHRG MGMT >30: CPT | Performed by: FAMILY MEDICINE

## 2024-06-03 RX ORDER — ACETAMINOPHEN 500 MG
1000 TABLET ORAL EVERY 8 HOURS PRN
Qty: 28 TABLET | Refills: 0 | Status: SHIPPED | OUTPATIENT
Start: 2024-06-03

## 2024-06-03 RX ORDER — DOXYCYCLINE HYCLATE 100 MG/1
100 CAPSULE ORAL EVERY 12 HOURS SCHEDULED
Qty: 17 CAPSULE | Refills: 0 | Status: SHIPPED | OUTPATIENT
Start: 2024-06-03 | End: 2024-06-12

## 2024-06-03 RX ADMIN — SODIUM CHLORIDE 125 ML/HR: 0.9 INJECTION, SOLUTION INTRAVENOUS at 05:16

## 2024-06-03 RX ADMIN — HEPARIN SODIUM 5000 UNITS: 5000 INJECTION INTRAVENOUS; SUBCUTANEOUS at 05:19

## 2024-06-03 RX ADMIN — LORATADINE 10 MG: 10 TABLET ORAL at 08:27

## 2024-06-03 RX ADMIN — DOXYCYCLINE 100 MG: 100 CAPSULE ORAL at 08:27

## 2024-06-03 NOTE — PLAN OF CARE
Problem: PAIN - ADULT  Goal: Verbalizes/displays adequate comfort level or baseline comfort level  Description: Interventions:  - Encourage patient to monitor pain and request assistance  - Assess pain using appropriate pain scale  - Administer analgesics based on type and severity of pain and evaluate response  - Implement non-pharmacological measures as appropriate and evaluate response  - Consider cultural and social influences on pain and pain management  - Notify physician/advanced practitioner if interventions unsuccessful or patient reports new pain  Outcome: Progressing     Problem: INFECTION - ADULT  Goal: Absence or prevention of progression during hospitalization  Description: INTERVENTIONS:  - Assess and monitor for signs and symptoms of infection  - Monitor lab/diagnostic results  - Monitor all insertion sites, i.e. indwelling lines, tubes, and drains  - Monitor endotracheal if appropriate and nasal secretions for changes in amount and color  - [place] appropriate cooling/warming therapies per order  - Administer medications as ordered  - Instruct and encourage patient and family to use good hand hygiene technique  - Identify and instruct in appropriate isolation precautions for identified infection/condition  Outcome: Progressing  Goal: Absence of fever/infection during neutropenic period  Description: INTERVENTIONS:  - Monitor WBC    Outcome: Progressing     Problem: DISCHARGE PLANNING  Goal: Discharge to home or other facility with appropriate resources  Description: INTERVENTIONS:  - Identify barriers to discharge w/patient and caregiver  - Arrange for needed discharge resources and transportation as appropriate  - Identify discharge learning needs (meds, wound care, etc.)  - Arrange for interpretive services to assist at discharge as needed  - Refer to Case Management Department for coordinating discharge planning if the patient needs post-hospital services based on physician/advanced  practitioner order or complex needs related to functional status, cognitive ability, or social support system  Outcome: Progressing     Problem: Knowledge Deficit  Goal: Patient/family/caregiver demonstrates understanding of disease process, treatment plan, medications, and discharge instructions  Description: Complete learning assessment and assess knowledge base.  Interventions:  - Provide teaching at level of understanding  - Provide teaching via preferred learning methods  Outcome: Progressing

## 2024-06-03 NOTE — PLAN OF CARE
Problem: PAIN - ADULT  Goal: Verbalizes/displays adequate comfort level or baseline comfort level  Description: Interventions:  - Encourage patient to monitor pain and request assistance  - Assess pain using appropriate pain scale  - Administer analgesics based on type and severity of pain and evaluate response  - Implement non-pharmacological measures as appropriate and evaluate response  - Consider cultural and social influences on pain and pain management  - Notify physician/advanced practitioner if interventions unsuccessful or patient reports new pain  Outcome: Progressing     Problem: INFECTION - ADULT  Goal: Absence or prevention of progression during hospitalization  Description: INTERVENTIONS:  - Assess and monitor for signs and symptoms of infection  - Monitor lab/diagnostic results  - Monitor all insertion sites, i.e. indwelling lines, tubes, and drains  - Monitor endotracheal if appropriate and nasal secretions for changes in amount and color  - [place] appropriate cooling/warming therapies per order  - Administer medications as ordered  - Instruct and encourage patient and family to use good hand hygiene technique  - Identify and instruct in appropriate isolation precautions for identified infection/condition  Outcome: Progressing     Problem: DISCHARGE PLANNING  Goal: Discharge to home or other facility with appropriate resources  Description: INTERVENTIONS:  - Identify barriers to discharge w/patient and caregiver  - Arrange for needed discharge resources and transportation as appropriate  - Identify discharge learning needs (meds, wound care, etc.)  - Arrange for interpretive services to assist at discharge as needed  - Refer to Case Management Department for coordinating discharge planning if the patient needs post-hospital services based on physician/advanced practitioner order or complex needs related to functional status, cognitive ability, or social support system  Outcome: Progressing      Problem: Knowledge Deficit  Goal: Patient/family/caregiver demonstrates understanding of disease process, treatment plan, medications, and discharge instructions  Description: Complete learning assessment and assess knowledge base.  Interventions:  - Provide teaching at level of understanding  - Provide teaching via preferred learning methods  Outcome: Progressing

## 2024-06-04 NOTE — DISCHARGE SUMMARY
Critical access hospital  Discharge- Antony Harvey 1980, 43 y.o. male MRN: [id number]  Unit/Bed#: E5 -01 Encounter: [id number]  Primary Care Provider: No primary care provider on file.   Date and time admitted to hospital: 6/1/2024  6:19 PM    * XOCHITL (acute kidney injury) ([facility])  Assessment & Plan  43 year old Upper sorbian-speaking male was admitted due to acute kidney injury. Etiology: pre-renal versus Ibuprofen (Although one time use only last Thursday).  Improved with IVF, tolerating PO intake well  Stable for discharge  Referral to PCP    Recent Labs     06/01/24  1842 06/02/24  1017   BUN 29* 21   CREATININE 3.04* 2.15*   EGFR 24 36         Results from last 7 days   Lab Units 06/01/24 2011   BLOOD UA  Trace*   PROTEIN UA mg/dl Negative           Funiculitis  Assessment & Plan  Presented with nausea, vomiting, and abdominal pain. Improving.   CT Abdomen pelvis showing: No acute findings in the abdomen or pelvis within the limits of unenhanced technique.   US scrotum and testicles: No evidence for testicular torsion. Question asymmetric hyperemia in the region of the left spermatic cord possibly reflective of infectious versus inflammatory funiculitis. Recommend clinical correlation.  Received Ceftriaxone IM  Urology recommendations appreciated:   Continue for total 10-day course of doxycycline  Outpatient f/u with Urology.  GC/chlamydia negative  Stable for discharge        Medical Problems       Resolved Problems  Date Reviewed: 6/3/2024   None       Discharging Physician / Practitioner: Lizzy Denis MD  PCP: No primary care provider on file.  Admission Date:   Admission Orders (From admission, onward)       Ordered        06/02/24 1730  INPATIENT ADMISSION  Once            06/01/24 2108  Place in Observation  Once                          Discharge Date: 06/03/24    Consultations During Hospital Stay:  Urology    Procedures Performed:   CT abdomen pelvis wo contrast    Final Result by Kelvin Jara MD (06/01 2018)      No acute findings in the abdomen or pelvis within the limits of unenhanced technique.      Workstation performed: [id number]         US scrotum and testicles   Final Result by Kelvin Jara MD (06/01 2013)      No evidence for testicular torsion.      Question asymmetric hyperemia in the region of the left spermatic cord possibly reflective of infectious versus inflammatory funiculitis. Recommend clinical correlation.      Workstation performed: [id number]               Significant Findings / Test Results:   Results from last 7 days   Lab Units 06/01/24  1842   WBC Thousand/uL 8.34   HEMOGLOBIN g/dL 15.8   HEMATOCRIT % 48.0   PLATELETS Thousands/uL 186     Results from last 7 days   Lab Units 06/03/24  0518   SODIUM mmol/L 140   POTASSIUM mmol/L 4.0   CHLORIDE mmol/L 111*   CO2 mmol/L 24   BUN mg/dL 18   CREATININE mg/dL 1.51*   CALCIUM mg/dL 8.1*       Test Results Pending at Discharge (will require follow up):   None     Outpatient Tests Requested:  None    Complications:  None    Reason for Admission: nausea, vomiting, abdominal pain    Hospital Course:   Antony Harvey is a 43 y.o. male patient who originally presented to the hospital on 6/1/2024 due to nausea, vomiting, abdominal pain. Diagnosed with XOCHITL as well as infectious vs inflammatory funiculitis. Improved with IVF, antibiotics and supportive treatments. Discharged on a course of doxycycline, referrals to PCP, Urology.       Please see above list of diagnoses and related plan for additional information.     Condition at Discharge: good    Discharge Day Visit / Exam:     Subjective:  Patient reports feeling overall improved and would like to be discharged home.     Vitals: Blood Pressure: 123/83 (06/03/24 0717)  Pulse: 60 (06/03/24 0717)  Temperature: 97.9 °F (36.6 °C) (06/03/24 0717)  Temp Source: Oral (06/03/24 0717)  Respirations: 16 (06/03/24 0717)  Weight - Scale: 90 kg (198 lb 6.6 oz)  (06/01/24 1817)  SpO2: 96 % (06/03/24 0776)  Exam:   Physical Exam  Constitutional:       General: He is not in acute distress.  HENT:      Head: Normocephalic and atraumatic.      Mouth/Throat:      Pharynx: Oropharynx is clear.   Cardiovascular:      Rate and Rhythm: Normal rate and regular rhythm.   Pulmonary:      Effort: No respiratory distress.      Breath sounds: No wheezing or rales.   Abdominal:      General: Abdomen is flat.      Palpations: Abdomen is soft.      Tenderness: There is abdominal tenderness (mild lower). There is no guarding.   Musculoskeletal:      Right lower leg: No edema.      Left lower leg: No edema.   Skin:     General: Skin is warm and dry.   Neurological:      Mental Status: He is oriented to person, place, and time.   Psychiatric:         Mood and Affect: Mood normal.          Discussion with Family: Updated  (brother) at bedside.    Discharge instructions/Information to patient and family:   See after visit summary for information provided to patient and family.      Provisions for Follow-Up Care:  See after visit summary for information related to follow-up care and any pertinent home health orders.      Mobility at time of Discharge:   Basic Mobility Inpatient Raw Score: 24  JH-HLM Goal: 8: Walk 250 feet or more  JH-HLM Achieved: 8: Walk 250 feet ot more  HLM Goal achieved. Continue to encourage appropriate mobility.     Disposition:   Home    Planned Readmission: No     Discharge Statement:  I spent 35 minutes discharging the patient. This time was spent on the day of discharge. I had direct contact with the patient on the day of discharge. Greater than 50% of the total time was spent examining patient, answering all patient questions, arranging and discussing plan of care with patient as well as directly providing post-discharge instructions.  Additional time then spent on discharge activities.    Discharge Medications:  See after visit summary for reconciled  discharge medications provided to patient and/or family.      **Please Note: This note may have been constructed using a voice recognition system**

## 2024-06-04 NOTE — ASSESSMENT & PLAN NOTE
Presented with nausea, vomiting, and abdominal pain. Improving.   CT Abdomen pelvis showing: No acute findings in the abdomen or pelvis within the limits of unenhanced technique.   US scrotum and testicles: No evidence for testicular torsion. Question asymmetric hyperemia in the region of the left spermatic cord possibly reflective of infectious versus inflammatory funiculitis. Recommend clinical correlation.  Received Ceftriaxone IM  Urology recommendations appreciated:   Continue for total 10-day course of doxycycline  Outpatient f/u with Urology.  GC/chlamydia negative  Stable for discharge

## 2024-06-07 NOTE — UTILIZATION REVIEW
NOTIFICATION OF ADMISSION DISCHARGE   This is a Notification of Discharge from [facility]. Please be advised that this patient has been discharge from our facility. Below you will find the admission and discharge date and time including the patient’s disposition.   UTILIZATION REVIEW CONTACT:  Sabine Pérez MA  Utilization   Network Utilization Review Department  Phone: [phone] x carefully listen to the prompts. All voicemails are confidential.  Email: [email or web address]     ADMISSION INFORMATION  PRESENTATION DATE: 6/1/2024  6:19 PM  OBERVATION ADMISSION DATE:   INPATIENT ADMISSION DATE: 6/2/24  5:31 PM   DISCHARGE DATE: 6/3/2024  1:42 PM   DISPOSITION:Home/Self Care    Network Utilization Review Department  ATTENTION: Please call with any questions or concerns to [phone] and carefully listen to the prompts so that you are directed to the right person. All voicemails are confidential.   For Discharge needs, contact Care Management DC Support Team at [phone] opt. 2  Send all requests for admission clinical reviews, approved or denied determinations and any other requests to dedicated fax number below belonging to the campus where the patient is receiving treatment. List of dedicated fax numbers for the Facilities:  FACILITY NAME UR FAX NUMBER   ADMISSION DENIALS (Administrative/Medical Necessity) [phone]   DISCHARGE SUPPORT TEAM ([facility]) [phone]   PARENT CHILD HEALTH (Maternity/NICU/Pediatrics) [phone]   [facility] [phone]   [facility] [phone]   [facility] [phone]   [facility] [phone]   [facility] [phone]   [facility] [phone]   [facility] [phone]   [facility] [phone]    [facility] [phone]   [facility] [phone]   [facility] [phone]   [facility] [phone]